# Patient Record
Sex: FEMALE | Race: OTHER | NOT HISPANIC OR LATINO
[De-identification: names, ages, dates, MRNs, and addresses within clinical notes are randomized per-mention and may not be internally consistent; named-entity substitution may affect disease eponyms.]

---

## 2017-09-19 ENCOUNTER — RESULT REVIEW (OUTPATIENT)
Age: 47
End: 2017-09-19

## 2021-07-29 PROBLEM — Z00.00 ENCOUNTER FOR PREVENTIVE HEALTH EXAMINATION: Status: ACTIVE | Noted: 2021-07-29

## 2021-08-20 ENCOUNTER — APPOINTMENT (OUTPATIENT)
Dept: ORTHOPEDIC SURGERY | Facility: CLINIC | Age: 51
End: 2021-08-20
Payer: COMMERCIAL

## 2021-08-20 ENCOUNTER — NON-APPOINTMENT (OUTPATIENT)
Age: 51
End: 2021-08-20

## 2021-08-20 VITALS
WEIGHT: 190 LBS | DIASTOLIC BLOOD PRESSURE: 80 MMHG | OXYGEN SATURATION: 97 % | HEIGHT: 66 IN | SYSTOLIC BLOOD PRESSURE: 144 MMHG | HEART RATE: 83 BPM | BODY MASS INDEX: 30.53 KG/M2

## 2021-08-20 DIAGNOSIS — I10 ESSENTIAL (PRIMARY) HYPERTENSION: ICD-10-CM

## 2021-08-20 DIAGNOSIS — M65.311 TRIGGER THUMB, RIGHT THUMB: ICD-10-CM

## 2021-08-20 PROCEDURE — 99204 OFFICE O/P NEW MOD 45 MIN: CPT | Mod: 25

## 2021-08-20 PROCEDURE — 20550 NJX 1 TENDON SHEATH/LIGAMENT: CPT | Mod: F5

## 2021-08-20 PROCEDURE — 73130 X-RAY EXAM OF HAND: CPT | Mod: RT

## 2021-09-20 ENCOUNTER — APPOINTMENT (OUTPATIENT)
Dept: ORTHOPEDIC SURGERY | Facility: CLINIC | Age: 51
End: 2021-09-20

## 2024-01-18 ENCOUNTER — NON-APPOINTMENT (OUTPATIENT)
Age: 54
End: 2024-01-18

## 2024-05-21 ENCOUNTER — EMERGENCY (EMERGENCY)
Facility: HOSPITAL | Age: 54
LOS: 0 days | Discharge: HOME | End: 2024-05-21
Attending: STUDENT IN AN ORGANIZED HEALTH CARE EDUCATION/TRAINING PROGRAM
Payer: COMMERCIAL

## 2024-05-21 VITALS
RESPIRATION RATE: 19 BRPM | SYSTOLIC BLOOD PRESSURE: 159 MMHG | TEMPERATURE: 98 F | DIASTOLIC BLOOD PRESSURE: 92 MMHG | HEART RATE: 72 BPM | OXYGEN SATURATION: 99 % | HEIGHT: 66 IN | WEIGHT: 192.02 LBS

## 2024-05-21 VITALS
SYSTOLIC BLOOD PRESSURE: 148 MMHG | DIASTOLIC BLOOD PRESSURE: 86 MMHG | OXYGEN SATURATION: 97 % | HEART RATE: 71 BPM | TEMPERATURE: 98 F | RESPIRATION RATE: 18 BRPM

## 2024-05-21 DIAGNOSIS — M25.562 PAIN IN LEFT KNEE: ICD-10-CM

## 2024-05-21 DIAGNOSIS — M54.9 DORSALGIA, UNSPECIFIED: ICD-10-CM

## 2024-05-21 DIAGNOSIS — I10 ESSENTIAL (PRIMARY) HYPERTENSION: ICD-10-CM

## 2024-05-21 DIAGNOSIS — M25.561 PAIN IN RIGHT KNEE: ICD-10-CM

## 2024-05-21 PROCEDURE — 72082 X-RAY EXAM ENTIRE SPI 2/3 VW: CPT | Mod: 26

## 2024-05-21 PROCEDURE — 73562 X-RAY EXAM OF KNEE 3: CPT | Mod: 26,50

## 2024-05-21 PROCEDURE — 99284 EMERGENCY DEPT VISIT MOD MDM: CPT

## 2024-05-21 RX ORDER — IBUPROFEN 200 MG
1 TABLET ORAL
Qty: 30 | Refills: 0
Start: 2024-05-21 | End: 2024-05-25

## 2024-05-21 RX ORDER — LIDOCAINE 4 G/100G
1 CREAM TOPICAL ONCE
Refills: 0 | Status: COMPLETED | OUTPATIENT
Start: 2024-05-21 | End: 2024-05-21

## 2024-05-21 RX ORDER — METHOCARBAMOL 500 MG/1
750 TABLET, FILM COATED ORAL ONCE
Refills: 0 | Status: COMPLETED | OUTPATIENT
Start: 2024-05-21 | End: 2024-05-21

## 2024-05-21 RX ORDER — IBUPROFEN 200 MG
600 TABLET ORAL ONCE
Refills: 0 | Status: COMPLETED | OUTPATIENT
Start: 2024-05-21 | End: 2024-05-21

## 2024-05-21 RX ORDER — METHOCARBAMOL 500 MG/1
1 TABLET, FILM COATED ORAL
Qty: 15 | Refills: 0
Start: 2024-05-21 | End: 2024-05-25

## 2024-05-21 RX ADMIN — LIDOCAINE 1 PATCH: 4 CREAM TOPICAL at 09:34

## 2024-05-21 RX ADMIN — Medication 600 MILLIGRAM(S): at 09:35

## 2024-05-21 RX ADMIN — METHOCARBAMOL 750 MILLIGRAM(S): 500 TABLET, FILM COATED ORAL at 09:35

## 2024-05-21 NOTE — ED PROVIDER NOTE - CLINICAL SUMMARY MEDICAL DECISION MAKING FREE TEXT BOX
54F PMH HTN pw 3wks of atraumatic BL knee pain and L low back pain x3 days. Afebrile, VSS. Well appearing, in NAD. Exam as noted in PE. Plan for XR BLE knees, give Robaxin, Motrin, Lidoderm. Re-eval. 54F PMH HTN pw 3wks of atraumatic BL knee pain and L low back pain x3 days. Afebrile, VSS. Well appearing, in NAD. Exam as noted in PE. Plan for XR BLE knees, T/L spine. Give Robaxin, Motrin, Lidoderm. Re-eval. 54F PMH HTN pw 3wks of atraumatic BL knee pain and L mid-back pain x3 days. Afebrile, VSS. Well appearing, in NAD. Exam as noted in PE. Plan for XR BLE knees, T/L spine. Give Robaxin, Motrin, Lidoderm. Re-eval.  XR imaging w/o acute pathology. On re-eval, resting comfortably. Clinical presentation most c/w MSK pain 2/2 overexertion. Stable for d/c home. Given scripts for Motrin, Robaxin. Given / recommend outpatient close Ortho and PCP f/u. Return signs / symptoms d/w pt. She understands / agrees w/ this plan.

## 2024-05-21 NOTE — ED PROVIDER NOTE - NS ED ATTENDING STATEMENT MOD
- She is unable to tolerate CPAP despite multiple masks and positional devices  - We reviewed the risks of untreated sleep apnea  She accepts the risks and would like to discontinue the machine   - encouraged weight loss    - we will fax in order to her DME to discontinue CPAP Attending Only

## 2024-05-21 NOTE — ED PROVIDER NOTE - CARE PROVIDER_API CALL
Martha Mcguire  Orthopaedic Surgery  [address] [facility], [address] [place] [id number]  Phone: [phone]  Fax: [phone]  Follow Up Time: Routine

## 2024-05-21 NOTE — ED PROVIDER NOTE - OBJECTIVE STATEMENT
54F PMH HTN pw BL knee pain x 3wks. Pt reports 6/10 pain. Pt states able to ambulate but w/ pain w/ climbing stairs. Pt reports medial pain w/ bending knees. Pt also w/ 'grabbing' L lower back pain x 3 days. Reports back pain is 8/10. Denies F/C, h/a, dizziness, CP, SOB, cough, abd pain, N/V/D/C, dysuria, hematuria, LE swelling, numbness / tingling / weakness. Pt denies clear hx injury / trauma, but reports to recently starting to exercise 4wks ago. Pt taking Tylenol w/o relief.     PMH as above, PSH breast fibroid, NKDA, Meds as listed, post-menopausal. 54F PMH HTN pw BL knee pain x 3wks. Pt reports 6/10 pain. Pt states able to ambulate but w/ pain w/ climbing stairs. Pt reports medial pain w/ bending knees. Pt also w/ 'grabbing' L mid back pain x 3 days. Reports back pain is 8/10. Denies F/C, h/a, dizziness, CP, SOB, cough, abd pain, N/V/D/C, dysuria, hematuria, LE swelling, numbness / tingling / weakness. Pt denies clear hx injury / trauma, but reports to recently starting to exercise 4wks ago. Pt taking Tylenol w/o relief.     PMH as above, PSH breast fibroid, NKDA, Meds as listed, post-menopausal. 54F PMH HTN pw BL knee pain x 3wks. Pt reports 6/10 pain. Pt states able to ambulate but w/ pain w/ climbing stairs. Pt reports medial pain w/ bending knees. Pt also w/ 'grabbing' L mid-back pain x 3 days. Reports back pain is 8/10. Denies F/C, h/a, dizziness, CP, SOB, cough, abd pain, N/V/D/C, dysuria, hematuria, LE swelling, numbness / tingling / weakness. Pt denies clear hx injury / trauma, but reports to recently starting to exercise 4wks ago. Pt taking Tylenol w/o relief.     PMH as above, PSH breast fibroid, NKDA, Meds as listed, post-menopausal.

## 2024-05-21 NOTE — ED ADULT NURSE NOTE - OBJECTIVE STATEMENT
A&OX4. patient C/O left upper back pain radiating to left flank patient states started working out 1xmonth ago and started having B/L knee pain . denies numbness/ tingling to legs patient fully ambulatory no relief with OTC meds no pmh

## 2024-05-21 NOTE — ED ADULT TRIAGE NOTE - PATIENT'S PREFERRED PRONOUN
Patient's first and last name, , procedure, and correct site confirmed prior to the start of procedure. Patient's first and last name, , procedure, and correct site confirmed prior to the start of procedure. Her/She Patient's first and last name, , procedure, and correct site confirmed prior to the start of procedure.

## 2024-05-21 NOTE — ED PROVIDER NOTE - PHYSICAL EXAMINATION
GEN: Awake, alert, interactive, NAD.  HEAD AND NECK: NC/AT. Airway patent. Neck supple.   EYES:  Clear b/l. EOMI. PERRL.   ENT: Moist mucus membranes.   CARDIAC: Regular rate, regular rhythm. No evident pedal edema.    RESP/CHEST: Normal respiratory effort with no use of accessory muscles or retractions. Clear throughout on auscultation.  ABD: Soft, non-distended, non-tender. No rebound, no guarding.   BACK: No midline spinal TTP. No CVAT.   EXTREMITIES: Moving all extremities with no apparent deformities. Full ROM BL knees, BLE NVI, ambulatory in ED.   SKIN: Warm, dry, intact normal color. No rash.   NEURO: AOx3, CN II-XII grossly intact, no focal deficits.   PSYCH: Appropriate mood and affect. GEN: Awake, alert, interactive, NAD.  HEAD AND NECK: NC/AT. Airway patent. Neck supple.   EYES:  Clear b/l. EOMI. PERRL.   ENT: Moist mucus membranes.   CARDIAC: Regular rate, regular rhythm. No evident pedal edema.    RESP/CHEST: Normal respiratory effort with no use of accessory muscles or retractions. Clear throughout on auscultation.  ABD: Soft, non-distended, non-tender. No rebound, no guarding.   BACK: No midline spinal TTP. No CVAT. + TTP L lower posterior ribcage.   EXTREMITIES: Moving all extremities with no apparent deformities. Full ROM BL knees, BLE NVI, ambulatory in ED.   SKIN: Warm, dry, intact normal color. No rash.   NEURO: AOx3, CN II-XII grossly intact, no focal deficits.   PSYCH: Appropriate mood and affect.

## 2024-05-21 NOTE — ED ADULT TRIAGE NOTE - CHIEF COMPLAINT QUOTE
Patient presents to ED c/o left knee pain and left lower pain since Sunday. Patient admits exercising more often recently. Took Tylenol with no relief. Denies any trauma, injury or urinary symptoms.   hx HTN

## 2024-05-21 NOTE — ED PROVIDER NOTE - PATIENT PORTAL LINK FT
You can access the FollowMyHealth Patient Portal offered by [facility] by registering at the following website: [email or web address]. By joining [email or web address]’s FollowMyHealth portal, you will also be able to view your health information using other applications (apps) compatible with our system.

## 2024-06-04 ENCOUNTER — APPOINTMENT (OUTPATIENT)
Dept: ORTHOPEDIC SURGERY | Facility: CLINIC | Age: 54
End: 2024-06-04
Payer: COMMERCIAL

## 2024-06-04 ENCOUNTER — APPOINTMENT (OUTPATIENT)
Dept: ORTHOPEDIC SURGERY | Facility: CLINIC | Age: 54
End: 2024-06-04

## 2024-06-04 VITALS — HEIGHT: 66 IN | WEIGHT: 190 LBS | BODY MASS INDEX: 30.53 KG/M2

## 2024-06-04 DIAGNOSIS — I10 ESSENTIAL (PRIMARY) HYPERTENSION: ICD-10-CM

## 2024-06-04 DIAGNOSIS — M17.11 UNILATERAL PRIMARY OSTEOARTHRITIS, RIGHT KNEE: ICD-10-CM

## 2024-06-04 DIAGNOSIS — M17.12 UNILATERAL PRIMARY OSTEOARTHRITIS, LEFT KNEE: ICD-10-CM

## 2024-06-04 PROCEDURE — 99214 OFFICE O/P EST MOD 30 MIN: CPT

## 2024-06-04 RX ORDER — AMLODIPINE BESYLATE 5 MG/1
TABLET ORAL
Refills: 0 | Status: ACTIVE | COMMUNITY

## 2024-06-04 RX ORDER — MELOXICAM 15 MG/1
15 TABLET ORAL
Qty: 30 | Refills: 0 | Status: ACTIVE | COMMUNITY
Start: 2024-06-04 | End: 1900-01-01

## 2024-06-04 NOTE — PHYSICAL EXAM
[5___] : hamstring 5[unfilled]/5 [Bilateral] : knee bilaterally [All Views] : anteroposterior, lateral, skyline, and anteroposterior standing [Degenerative change] : Degenerative change [] : no calf tenderness [Moderate tricompartmental OA medial narrowing] : Moderate tricompartmental OA medial narrowing [TWNoteComboBox7] : flexion 120 degrees [de-identified] : extension 0 degrees

## 2024-06-04 NOTE — HISTORY OF PRESENT ILLNESS
[Rest] : rest [Meds] : meds [Sudden] : sudden [8] : 8 [4] : 4 [Shooting] : shooting [Intermittent] : intermittent [Sleep] : sleep [Sitting] : sitting [Standing] : standing [Walking] : walking [Exercising] : exercising [Stairs] : stairs [de-identified] : 54 year old female with pain in bilateral knees, symptoms have been present for the past couple weeks, feels she over did something at the gym. PAin with walking, stiars, twisting motions. No mechanical symptoms. She has been taking Tylenol, using ice and remains symptomatic.Went to ED and given Motrin [] : no [FreeTextEntry1] : B/L KNEES [FreeTextEntry5] : Patient states she began having pain around 05/21 after going to gym.  [FreeTextEntry9] : tylenol  [de-identified] : ER

## 2024-06-04 NOTE — DISCUSSION/SUMMARY
[de-identified] : Patient allowed to gently start resuming activities. Discussed change to medication prescription and usage. Offered cortisone steroid injection.for pain control Bracing options discussed with patient. Hyaluronic Acid inj pamphlet given to pt. try topical lidocaine for pain reviewed current medications being used by this patient Home exercise for functional improvement 06/04/2024    RE:  CECELIA TRINHJOCELYN   Acct #- [id number]    Attention:  Nurse Reviewer /Medical Director  I am writing this letter as a medical necessity for PT program. Patient has tried analgesics, non-steroid anti-inflammatory agents,  hot or cold compresses,injections of corticosteroids, etc)  which in combination or by themselves has not worked. Based on my patient's condition, I strongly believe that the PT is medically needed.   Thank you for your time and consideration.

## 2025-06-25 ENCOUNTER — NON-APPOINTMENT (OUTPATIENT)
Age: 55
End: 2025-06-25